# Patient Record
Sex: MALE | Race: WHITE | NOT HISPANIC OR LATINO | Employment: FULL TIME | URBAN - METROPOLITAN AREA
[De-identification: names, ages, dates, MRNs, and addresses within clinical notes are randomized per-mention and may not be internally consistent; named-entity substitution may affect disease eponyms.]

---

## 2024-11-07 ENCOUNTER — NURSE TRIAGE (OUTPATIENT)
Dept: CALL CENTER | Facility: HOSPITAL | Age: 34
End: 2024-11-07
Payer: COMMERCIAL

## 2024-11-07 NOTE — TELEPHONE ENCOUNTER
Call transferred from the HUB. Caller is attempting to schedule a New Patient apt at the [facility] office. He is concerned with some foot pain that has been on going for a few months possible related to playing soccer. Pt is able to bear weight and perform daily tasks. He said at times it does have a tingling feeling. Call is Warm transferred to the [facility] office for a new patient apt.  Reason for Disposition   [1] MILD pain (e.g., does not interfere with normal activities) AND [2] present > 7 days    Additional Information   Negative: Followed a foot injury   Negative: Diabetes mellitus   Negative: Toe pain is main symptom   Negative: Ankle pain is main symptom   Negative: Thigh or calf pain is main symptom   Negative: Entire foot is cool or blue in comparison to other foot   Negative: Purple or black skin on foot or toe   Negative: [1] Red area or streak AND [2] fever   Negative: [1] Swollen foot AND [2] fever   Negative: Patient sounds very sick or weak to the triager   Negative: [1] SEVERE pain (e.g., excruciating, unable to do any normal activities) AND [2] not improved after 2 hours of pain medicine   Negative: [1] Looks infected (spreading redness, pus) AND [2] large red area (> 2 in. or 5 cm)   Negative: Looks like a boil, infected sore, or deep ulcer   Negative: [1] Redness of the skin AND [2] no fever   Negative: [1] Swollen foot AND [2] no fever  (Exceptions: localized bump from bunions, calluses, insect bite, sting)   Negative: Weakness (i.e., loss of strength) of new-onset in foot or toes  (Exceptions: not truly weak, foot feels weak because of pain; weakness present > 2 weeks)   Negative: Numbness (i.e., loss of sensation) in foot or toes  (Exception: Just tingling; numbness present > 2 weeks.)   Negative: [1] MODERATE pain (e.g., interferes with normal activities, limping) AND [2] present > 3 days   Negative: [1] Weakness or numbness in foot or toes AND [2] present > 2 weeks   Negative: [1] Tingling in  "feet AND [2] new or increased   Negative: Pain in the big toe joint    Answer Assessment - Initial Assessment Questions  1. ONSET: \"When did the pain start?\"       2 months  2. LOCATION: \"Where is the pain located?\"       Right foot  3. PAIN: \"How bad is the pain?\"    (Scale 1-10; or mild, moderate, severe)   - MILD (1-3): doesn't interfere with normal activities.    - MODERATE (4-7): interferes with normal activities (e.g., work or school) or awakens from sleep, limping.    - SEVERE (8-10): excruciating pain, unable to do any normal activities, unable to walk.       3 while bearing weight  4. WORK OR EXERCISE: \"Has there been any recent work or exercise that involved this part of the body?\"       Possible caused by a soccer injury but not sure  5. CAUSE: \"What do you think is causing the foot pain?\"      Possible old injury  6. OTHER SYMPTOMS: \"Do you have any other symptoms?\" (e.g., leg pain, rash, fever, numbness)      Some tingling   7. PREGNANCY: \"Is there any chance you are pregnant?\" \"When was your last menstrual period?\"      NA    Protocols used: Foot Pain-ADULT-AH    "

## 2024-11-18 ENCOUNTER — TELEPHONE (OUTPATIENT)
Dept: FAMILY MEDICINE CLINIC | Facility: CLINIC | Age: 34
End: 2024-11-18

## 2024-11-18 ENCOUNTER — OFFICE VISIT (OUTPATIENT)
Dept: FAMILY MEDICINE CLINIC | Facility: CLINIC | Age: 34
End: 2024-11-18
Payer: COMMERCIAL

## 2024-11-18 VITALS
BODY MASS INDEX: 36.58 KG/M2 | SYSTOLIC BLOOD PRESSURE: 134 MMHG | HEIGHT: 69 IN | OXYGEN SATURATION: 96 % | WEIGHT: 247 LBS | TEMPERATURE: 98.6 F | DIASTOLIC BLOOD PRESSURE: 88 MMHG | HEART RATE: 60 BPM

## 2024-11-18 DIAGNOSIS — R03.0 ELEVATED BLOOD PRESSURE READING IN OFFICE WITH WHITE COAT SYNDROME, WITHOUT DIAGNOSIS OF HYPERTENSION: ICD-10-CM

## 2024-11-18 DIAGNOSIS — M79.672 PAIN IN LEFT FOOT: ICD-10-CM

## 2024-11-18 DIAGNOSIS — M72.2 PLANTAR FASCIITIS, LEFT: Primary | ICD-10-CM

## 2024-11-18 DIAGNOSIS — S99.922A FOOT INJURY, LEFT, INITIAL ENCOUNTER: ICD-10-CM

## 2024-11-18 PROCEDURE — 99214 OFFICE O/P EST MOD 30 MIN: CPT

## 2024-11-18 NOTE — PROGRESS NOTES
Chief Complaint  Establish Care and Foot Pain (Left x's 1 year)    Subjective          Foot Pain  Associated symptoms include arthralgias. Pertinent negatives include no chest pain, chills, fever, joint swelling, myalgias, nausea, vomiting or weakness.     History of Present Illness  Hair Casper 34 y.o. male presents today for a new patient appointment. He is here to establish care and is a new patient to me. [facility] my MA/LPN staff.       Mr. Casper reports pain in the arch of his left foot, which began prior to a soccer injury one year ago. The pain, initially dull but intensifying over time, is exacerbated by standing and long-distance walking. He has attempted to alleviate the pain through applying ice and over-the-counter medications such as Ibuprofen and Tylenol, which provide some relief. Despite the pain, he continues to engage in most activities, although prolonged standing is challenging. He notes that the pain is not present when he walks barefoot and does not occur upon waking or after sitting for extended periods. He has not sought any treatment or evaluation for this issue. He also mentions a history of ankle injuries from playing soccer but currently reports no ankle pain.    He reports no chronic medical conditions such as high cholesterol, diabetes, or high blood pressure. He also has no history of kidney dysfunction or ulcers. He admits to irregular doctor visits due to lack of insurance. He has experienced elevated blood pressure readings in doctor's offices, which he attributes to anxiety and white coat syndrome. He is not experiencing any symptoms such as sharp chest pain, trouble breathing, headaches, blurred vision, dizziness, or lightheadedness. He recalls seeking treatment for exercise-induced asthma during his childhood in [place], which he believes was exacerbated by altitude. He occasionally experiences mucus production during running. He admits to a  "high intake of caffeine and salt.    FAMILY HISTORY  His father had diabetes.          Review of Systems   Constitutional:  Negative for chills and fever.   Respiratory:  Negative for chest tightness.    Cardiovascular:  Negative for chest pain, palpitations and leg swelling.   Gastrointestinal:  Negative for nausea and vomiting.   Musculoskeletal:  Positive for arthralgias. Negative for gait problem, joint swelling and myalgias.   Neurological:  Negative for dizziness, syncope, weakness and light-headedness.   Psychiatric/Behavioral:  Negative for dysphoric mood. The patient is not nervous/anxious.         Objective   Vital Signs:   /88   Pulse 60   Temp 98.6 °F (37 °C) (Oral)   Ht 175.3 cm (69\")   Wt 112 kg (247 lb)   SpO2 96%   BMI 36.48 kg/m²        Class 2 Severe Obesity (BMI >=35 and <=39.9). Obesity-related health conditions include the following: none. Obesity is newly identified. BMI is is above average; BMI management plan is completed. We discussed portion control and increasing exercise.       Physical Exam  Vitals and nursing note reviewed.   Constitutional:       General: He is not in acute distress.     Appearance: He is well-developed. He is obese.   HENT:      Head: Normocephalic and atraumatic.   Eyes:      General: No scleral icterus.     Conjunctiva/sclera: Conjunctivae normal.      Pupils: Pupils are equal, round, and reactive to light.   Neck:      Vascular: No carotid bruit.   Cardiovascular:      Rate and Rhythm: Normal rate and regular rhythm.      Pulses:           Dorsalis pedis pulses are 2+ on the left side.        Posterior tibial pulses are 2+ on the left side.      Heart sounds: Normal heart sounds.   Pulmonary:      Effort: Pulmonary effort is normal. No respiratory distress.      Breath sounds: Normal breath sounds. No decreased breath sounds, wheezing, rhonchi or rales.   Musculoskeletal:         General: Tenderness present. No swelling or deformity.      Cervical back: " Normal range of motion and neck supple.      Right lower leg: No edema.      Left lower leg: No edema.      Left foot: Normal range of motion and normal capillary refill. Tenderness present. No swelling, deformity, foot drop, laceration or bony tenderness. Normal pulse.      Comments: Tenderness with palpation of sole of left foot. Pain begins above the left heel and extends upward. Sensation is intact. DP and  PT pulses are intact and regular. No swelling. Normal range of motion. Normal gait.   Skin:     General: Skin is warm and dry.      Findings: No rash.   Neurological:      Mental Status: He is alert and oriented to person, place, and time.      Sensory: Sensation is intact. No sensory deficit.      Motor: No weakness, tremor, atrophy or abnormal muscle tone.      Coordination: Coordination normal.      Gait: Gait normal.      Deep Tendon Reflexes: Reflexes normal.   Psychiatric:         Mood and Affect: Mood normal. Mood is not anxious or depressed.         Behavior: Behavior normal.        Physical Exam  Vital Signs  Repeat manual blood pressure is 134/88.      Results                 Assessment and Plan    Assessment & Plan  1. Plantar Fasciitis.  The symptoms described, including pain in the arch of the foot that worsens with standing and improves with rest, are indicative of plantar fasciitis. Anti-inflammatory medication was recommended for pain management and inflammation reduction. Specific stretches for plantar fasciitis will be sent via [employer]. The use of shoes with good heel support and custom insoles were advised. A foot x-ray was ordered to rule out any other potential issues. He was instructed to take anti-inflammatories and apply ice as needed for swelling, and to elevate the affected lower extremity when sitting.    2. Elevated Blood Pressure with white coat syndrome.  Repeat manual blood pressure was mildly elevated at 134/88 mmHg, possibly due to whitecoat syndrome. A baseline set of labs  was ordered to assess cholesterol, fasting blood sugar, kidney function, liver function, and thyroid. Hepatitis C screening was also added to the lab work. He was advised to reduce caffeine and salt intake, increase water consumption, and engage in moderate intensity exercise for 30 minutes a day, five days per week. Weight loss was also recommended. He was instructed to monitor blood pressure at home and to return if consistent readings of 140/90 mmHg or higher are observed at home.    Follow-up  Return in 6 months for follow up.    Assessment & Plan  Plantar fasciitis, left    Orders:    XR Foot 3+ View Left    Pain in left foot    Orders:    XR Foot 3+ View Left    Elevated blood pressure reading in office with white coat syndrome, without diagnosis of hypertension    Orders:    Comprehensive metabolic panel    Lipid panel    CBC and Differential    TSH    Hepatitis C Antibody    Foot injury, left, initial encounter    Orders:    XR Foot 3+ View Left             Follow Up     Return in about 6 months (around 5/18/2025) for Recheck blood pressure.    Patient or patient representative verbalized consent for the use of Ambient Listening during the visit with  ROXANNA Parks for chart documentation. 11/18/2024  11:09 EST    Patient was given instructions and counseling regarding his condition or for health maintenance advice. Please see specific information pulled into the AVS if appropriate.

## 2024-11-18 NOTE — TELEPHONE ENCOUNTER
Hub staff attempted to follow warm transfer process and was unsuccessful     Caller: Hair Casper    Relationship to patient: Self    Best call back number: [phone]     Patient is needing: PATIENT CALLED AND NEEDS TO SCHEDULE LABS.  PLEASE CALL BACK TO SCHEDULE LABS.

## 2024-11-20 ENCOUNTER — PATIENT ROUNDING (BHMG ONLY) (OUTPATIENT)
Dept: FAMILY MEDICINE CLINIC | Facility: CLINIC | Age: 34
End: 2024-11-20
Payer: COMMERCIAL

## 2024-11-20 NOTE — PROGRESS NOTES
A My-Chart message has been sent to the patient for PATIENT ROUNDING with [facility]

## 2024-11-21 ENCOUNTER — HOSPITAL ENCOUNTER (OUTPATIENT)
Dept: GENERAL RADIOLOGY | Facility: HOSPITAL | Age: 34
Discharge: HOME | End: 2024-11-21
Payer: COMMERCIAL

## 2024-11-21 PROCEDURE — 73630 X-RAY EXAM OF FOOT: CPT

## 2024-11-22 LAB
ALBUMIN SERPL-MCNC: 4.5 G/DL (ref 3.5–5.2)
ALBUMIN/GLOB SERPL: 1.7 G/DL
ALP SERPL-CCNC: 74 U/L (ref 39–117)
ALT SERPL-CCNC: 42 U/L (ref 1–41)
AST SERPL-CCNC: 38 U/L (ref 1–40)
BASOPHILS # BLD AUTO: 0.04 10*3/MM3 (ref 0–0.2)
BASOPHILS NFR BLD AUTO: 0.7 % (ref 0–1.5)
BILIRUB SERPL-MCNC: 0.5 MG/DL (ref 0–1.2)
BUN SERPL-MCNC: 15 MG/DL (ref 6–20)
BUN/CREAT SERPL: 12.7 (ref 7–25)
CALCIUM SERPL-MCNC: 9.4 MG/DL (ref 8.6–10.5)
CHLORIDE SERPL-SCNC: 100 MMOL/L (ref 98–107)
CHOLEST SERPL-MCNC: 203 MG/DL (ref 0–200)
CO2 SERPL-SCNC: 27.3 MMOL/L (ref 22–29)
CREAT SERPL-MCNC: 1.18 MG/DL (ref 0.76–1.27)
EGFRCR SERPLBLD CKD-EPI 2021: 84.1 ML/MIN/1.73
EOSINOPHIL # BLD AUTO: 0.29 10*3/MM3 (ref 0–0.4)
EOSINOPHIL NFR BLD AUTO: 4.8 % (ref 0.3–6.2)
ERYTHROCYTE [DISTWIDTH] IN BLOOD BY AUTOMATED COUNT: 12.5 % (ref 12.3–15.4)
GLOBULIN SER CALC-MCNC: 2.7 GM/DL
GLUCOSE SERPL-MCNC: 104 MG/DL (ref 65–99)
HCT VFR BLD AUTO: 45.2 % (ref 37.5–51)
HCV IGG SERPL QL IA: NON REACTIVE
HDLC SERPL-MCNC: 36 MG/DL (ref 40–60)
HGB BLD-MCNC: 15.7 G/DL (ref 13–17.7)
IMM GRANULOCYTES # BLD AUTO: 0.04 10*3/MM3 (ref 0–0.05)
IMM GRANULOCYTES NFR BLD AUTO: 0.7 % (ref 0–0.5)
LDLC SERPL CALC-MCNC: 137 MG/DL (ref 0–100)
LYMPHOCYTES # BLD AUTO: 2.02 10*3/MM3 (ref 0.7–3.1)
LYMPHOCYTES NFR BLD AUTO: 33.4 % (ref 19.6–45.3)
MCH RBC QN AUTO: 32 PG (ref 26.6–33)
MCHC RBC AUTO-ENTMCNC: 34.7 G/DL (ref 31.5–35.7)
MCV RBC AUTO: 92.2 FL (ref 79–97)
MONOCYTES # BLD AUTO: 0.55 10*3/MM3 (ref 0.1–0.9)
MONOCYTES NFR BLD AUTO: 9.1 % (ref 5–12)
NEUTROPHILS # BLD AUTO: 3.11 10*3/MM3 (ref 1.7–7)
NEUTROPHILS NFR BLD AUTO: 51.3 % (ref 42.7–76)
NRBC BLD AUTO-RTO: 0 /100 WBC (ref 0–0.2)
PLATELET # BLD AUTO: 206 10*3/MM3 (ref 140–450)
POTASSIUM SERPL-SCNC: 4.6 MMOL/L (ref 3.5–5.2)
PROT SERPL-MCNC: 7.2 G/DL (ref 6–8.5)
RBC # BLD AUTO: 4.9 10*6/MM3 (ref 4.14–5.8)
SODIUM SERPL-SCNC: 139 MMOL/L (ref 136–145)
TRIGL SERPL-MCNC: 165 MG/DL (ref 0–150)
TSH SERPL DL<=0.005 MIU/L-ACNC: 2.03 UIU/ML (ref 0.27–4.2)
VLDLC SERPL CALC-MCNC: 30 MG/DL (ref 5–40)
WBC # BLD AUTO: 6.05 10*3/MM3 (ref 3.4–10.8)

## 2024-12-02 DIAGNOSIS — R73.01 IFG (IMPAIRED FASTING GLUCOSE): Primary | ICD-10-CM
